# Patient Record
Sex: FEMALE | Race: WHITE | URBAN - METROPOLITAN AREA
[De-identification: names, ages, dates, MRNs, and addresses within clinical notes are randomized per-mention and may not be internally consistent; named-entity substitution may affect disease eponyms.]

---

## 2017-02-21 ENCOUNTER — OFFICE VISIT (OUTPATIENT)
Dept: FAMILY MEDICINE | Facility: CLINIC | Age: 22
End: 2017-02-21
Payer: COMMERCIAL

## 2017-02-21 DIAGNOSIS — Z23 VACCINE FOR SINGLE BACTERIAL DISEASE: Primary | ICD-10-CM

## 2017-02-21 DIAGNOSIS — Z71.84 COUNSELING ABOUT TRAVEL: ICD-10-CM

## 2017-02-21 PROCEDURE — 99402 PREV MED CNSL INDIV APPRX 30: CPT | Mod: 25 | Performed by: FAMILY MEDICINE

## 2017-02-21 PROCEDURE — 90471 IMMUNIZATION ADMIN: CPT | Mod: GA | Performed by: FAMILY MEDICINE

## 2017-02-21 PROCEDURE — 90472 IMMUNIZATION ADMIN EACH ADD: CPT | Mod: GA | Performed by: FAMILY MEDICINE

## 2017-02-21 PROCEDURE — 90632 HEPA VACCINE ADULT IM: CPT | Mod: GA | Performed by: FAMILY MEDICINE

## 2017-02-21 PROCEDURE — 90691 TYPHOID VACCINE IM: CPT | Mod: GA | Performed by: FAMILY MEDICINE

## 2017-02-21 RX ORDER — ATOVAQUONE AND PROGUANIL HYDROCHLORIDE 250; 100 MG/1; MG/1
TABLET, FILM COATED ORAL
Qty: 80 TABLET | Refills: 0 | Status: SHIPPED | OUTPATIENT
Start: 2017-02-21

## 2017-02-21 RX ORDER — AZITHROMYCIN 500 MG/1
TABLET, FILM COATED ORAL
Qty: 3 TABLET | Refills: 0 | Status: SHIPPED | OUTPATIENT
Start: 2017-02-21

## 2017-02-21 RX ORDER — MELOXICAM 7.5 MG/1
7.5 TABLET ORAL
COMMUNITY
Start: 2017-02-16 | End: 2017-03-02

## 2017-02-21 RX ORDER — ATOVAQUONE AND PROGUANIL HYDROCHLORIDE 250; 100 MG/1; MG/1
TABLET, FILM COATED ORAL
Qty: 80 TABLET | Refills: 0 | Status: SHIPPED | OUTPATIENT
Start: 2017-02-21 | End: 2017-02-21

## 2017-02-21 RX ORDER — CITALOPRAM HYDROBROMIDE 10 MG/1
10 TABLET ORAL
COMMUNITY
Start: 2017-01-17

## 2017-02-21 NOTE — MR AVS SNAPSHOT
"              After Visit Summary   2/21/2017    Navin Paulino    MRN: [id number]           Patient Information     Date Of Birth          1995        Visit Information        Provider Department      2/21/2017 12:00 PM Cristiana Olmos, DO [facility]        Today's Diagnoses     Vaccine for single bacterial disease    -  1    Counseling about travel          Care Instructions    Today February 21, 2017 you received the    Hepatitis A Vaccine - second and final dose    Typhoid - injectable. This vaccine is valid for two years.   .    These appointments can be made as a NURSE ONLY visit.    **It is very important for the vaccinations to be given on the scheduled day(s), this helps ensure you receive the full effectiveness of the vaccine.**    Please call [facility] with any questions [phone]    Thank you for visiting [facility]            Follow-ups after your visit        Who to contact     If you have questions or need follow up information about today's clinic visit or your schedule please contact [facility] directly at [phone].  Normal or non-critical lab and imaging results will be communicated to you by MyChart, letter or phone within 4 business days after the clinic has received the results. If you do not hear from us within 7 days, please contact the clinic through MyChart or phone. If you have a critical or abnormal lab result, we will notify you by phone as soon as possible.  Submit refill requests through [employer] or call your pharmacy and they will forward the refill request to us. Please allow 3 business days for your refill to be completed.          Additional Information About Your Visit        MyChart Information     [employer] lets you send messages to your doctor, view your test results, renew your prescriptions, schedule appointments and more. To sign up, go to [email or web address] . Click on \"Log in\" on the left side of " "the screen, which will take you to the Welcome page. Then click on \"Sign up Now\" on the right side of the page.     You will be asked to enter the access code listed below, as well as some personal information. Please follow the directions to create your username and password.     Your access code is: [email or web address]  Expires: 2017  1:05 PM     Your access code will  in 90 days. If you need help or a new code, please call your [facility] or [phone].        Care EveryWhere ID     This is your Care EveryWhere ID. This could be used by other organizations to access your [facility] medical records  [id number]        Your Vitals Were     Last Period Breastfeeding?                2017 No           Blood Pressure from Last 3 Encounters:   17 (P) 102/64    Weight from Last 3 Encounters:   17 (P) 134 lb 2 oz (60.8 kg)              We Performed the Following     HEPATITIS A VACCINE (ADULT)     TYPHOID VACCINE, IM          Today's Medication Changes          These changes are accurate as of: 17  1:06 PM.  If you have any questions, ask your nurse or doctor.               Start taking these medicines.        Dose/Directions    atovaquone-proguanil 250-100 MG per tablet   Commonly known as:  MALARONE   Used for:  Counseling about travel        Take one tablet daily, start 1 day prior to travel to malaria area and continue daily while there and for 7 days after leaving malaria area   Quantity:  80 tablet   Refills:  0       azithromycin 500 MG tablet   Commonly known as:  ZITHROMAX   Used for:  Counseling about travel        Take one tablet daily for up to 3 days as needed for traveler's diarrhea   Quantity:  3 tablet   Refills:  0            Where to get your medicines      These medications were sent to [id number] ([place]) - [place], [place] - [address] AT [facility] [address]  [address], [facility] [address]     Phone:  [phone]     " azithromycin 500 MG tablet         Some of these will need a paper prescription and others can be bought over the counter.  Ask your nurse if you have questions.     Bring a paper prescription for each of these medications     atovaquone-proguanil 250-100 MG per tablet                Primary Care Provider    None Specified       No primary provider on file.        Thank you!     Thank you for choosing [facility]  for your care. Our goal is always to provide you with excellent care. Hearing back from our patients is one way we can continue to improve our services. Please take a few minutes to complete the written survey that you may receive in the mail after your visit with us. Thank you!             Your Updated Medication List - Protect others around you: Learn how to safely use, store and throw away your medicines at www.disposemymeds.org.          This list is accurate as of: 2/21/17  1:06 PM.  Always use your most recent med list.                   Brand Name Dispense Instructions for use    atovaquone-proguanil 250-100 MG per tablet    MALARONE    80 tablet    Take one tablet daily, start 1 day prior to travel to malaria area and continue daily while there and for 7 days after leaving malaria area       azithromycin 500 MG tablet    ZITHROMAX    3 tablet    Take one tablet daily for up to 3 days as needed for traveler's diarrhea       citalopram 10 MG tablet    celeXA     Take 10 mg by mouth       meloxicam 7.5 MG tablet    MOBIC     Take 7.5 mg by mouth

## 2017-02-21 NOTE — PATIENT INSTRUCTIONS
Today February 21, 2017 you received the    Hepatitis A Vaccine - second and final dose    Typhoid - injectable. This vaccine is valid for two years.   .    These appointments can be made as a NURSE ONLY visit.    **It is very important for the vaccinations to be given on the scheduled day(s), this helps ensure you receive the full effectiveness of the vaccine.**    Please call [facility] with any questions [phone]    Thank you for visiting [facility]

## 2017-02-21 NOTE — PROGRESS NOTES
"  Itinerary:  [place]    Departure Date: 02/26/2017    Return Date: 05/07/2017    Length of Trip: 3 month    Purpose of Trip: pleasure    Urban/Rural: both    Accommodations: Hotel and hostel    IMMUNIZATION HISTORY  Have you received any immunizations within the past 4 weeks?  No  Have you ever fainted from having your blood drawn or from an injection?  No  Have you ever had a fever reaction to vaccination?  Yes  Have you ever had any bad reaction or side effect from any vaccination?  No  Have you ever had hepatitis A or B vaccine?  No but ACTUALLY YES  Do you live (or work closely) with anyone who has AIDS, an AIDS-like condition, any other immune disorder or who is on chemotherapy for cancer or a   family history of immunodeficiency?  No  Have you received any injection of immune globulin or any blood products during the past 12 months?  No    Patient roomed by Aleshia Rick CMA    Special medical concerns: none    BP (P) 102/64 (BP Location: Left arm, Patient Position: Left side, Cuff Size: Adult Regular)  Pulse (P) 94  Temp (P) 97.9  F (36.6  C) (Tympanic)  Ht (P) 5' 3\" (1.6 m)  Wt (P) 134 lb 2 oz (60.8 kg)  LMP 01/18/2017  SpO2 (P) 98%  Breastfeeding? No  BMI (P) 23.76 kg/m2  EXAM: deferred    Immunizations discussed include: Hepatitis A, Typhoid and   Declined japanese encephalitis  Malaraia prophylaxis recommended: Malarone - printed - she is unsure if she will be taking the prescription  Symptomatic treatment for traveler's diarrhea: azithromycin    ASSESSMENT/PLAN:  1. Vaccine for single bacterial disease     - TYPHOID VACCINE, IM  - HEPATITIS A VACCINE (ADULT)    2. Counseling about travel     - azithromycin (ZITHROMAX) 500 MG tablet; Take one tablet daily for up to 3 days as needed for traveler's diarrhea  Dispense: 3 tablet; Refill: 0  - atovaquone-proguanil (MALARONE) 250-100 MG per tablet; Take one tablet daily, start 1 day prior to travel to malaria area and continue daily while there and for 7 days " after leaving malaria area  Dispense: 80 tablet; Refill: 0  I have reviewed general recommendations for safe travel   including: food/water precautions, insect avoidance,   roadway safety. Educational materials and [employer] report provided.    Total visit time 75 minutes (two family members) with over 50% of time spent counseling patient.

## 2017-02-24 ENCOUNTER — TELEPHONE (OUTPATIENT)
Dept: FAMILY MEDICINE | Facility: CLINIC | Age: 22
End: 2017-02-24

## 2017-02-24 NOTE — TELEPHONE ENCOUNTER
Reason for call: Berta, mom, requesting rx Malarone.  Pt was seen by  for travel on 2/17/2017.  They are missing the rx for Malarone  Fax to [facility] in [place] [phone]  Pt is waiting at pharmacy    Phone Number Pt can be reached at: [phone].  Best Time: Anytime  Can we leave a detailed message on this number? Yes

## 2017-02-24 NOTE — TELEPHONE ENCOUNTER
Spoke with pharmacy and verbal rx given for Malarone prescribed on 02/21/2017    Gloria Barraza, CMA